# Patient Record
Sex: FEMALE | Race: WHITE | NOT HISPANIC OR LATINO | URBAN - METROPOLITAN AREA
[De-identification: names, ages, dates, MRNs, and addresses within clinical notes are randomized per-mention and may not be internally consistent; named-entity substitution may affect disease eponyms.]

---

## 2024-07-18 ENCOUNTER — OFFICE VISIT (OUTPATIENT)
Dept: URBAN - METROPOLITAN AREA CLINIC 9 | Facility: CLINIC | Age: 59
End: 2024-07-18
Payer: COMMERCIAL

## 2024-07-18 ENCOUNTER — DASHBOARD ENCOUNTERS (OUTPATIENT)
Age: 59
End: 2024-07-18

## 2024-07-18 VITALS
BODY MASS INDEX: 20.09 KG/M2 | HEIGHT: 66 IN | WEIGHT: 125 LBS | SYSTOLIC BLOOD PRESSURE: 110 MMHG | DIASTOLIC BLOOD PRESSURE: 72 MMHG

## 2024-07-18 DIAGNOSIS — R19.4 CHANGE IN BOWEL HABIT: ICD-10-CM

## 2024-07-18 DIAGNOSIS — R93.3 IMAGING OF GASTROINTESTINAL TRACT ABNORMAL: ICD-10-CM

## 2024-07-18 DIAGNOSIS — R10.13 EPIGASTRIC ABDOMINAL PAIN: ICD-10-CM

## 2024-07-18 DIAGNOSIS — K74.69 OTHER CIRRHOSIS OF LIVER: ICD-10-CM

## 2024-07-18 DIAGNOSIS — D86.89 GRANULOMA OF LIVER ASSOCIATED WITH SARCOIDOSIS: ICD-10-CM

## 2024-07-18 PROCEDURE — 99204 OFFICE O/P NEW MOD 45 MIN: CPT | Performed by: INTERNAL MEDICINE

## 2024-07-18 RX ORDER — FAMOTIDINE 40 MG/1
1 TABLET TABLET, FILM COATED ORAL ONCE A DAY
OUTPATIENT

## 2024-07-18 RX ORDER — BUPROPION HYDROCHLORIDE 150 MG/1
1 TABLET IN THE MORNING TABLET, EXTENDED RELEASE ORAL ONCE A DAY
Status: ACTIVE | COMMUNITY

## 2024-07-18 RX ORDER — FAMOTIDINE 40 MG/1
1 TABLET TABLET, FILM COATED ORAL ONCE A DAY
Status: ACTIVE | COMMUNITY

## 2024-07-18 RX ORDER — SUCRALFATE 1 G/1
1 TABLET BEFORE MEALS AND 1 TABLET AT NIGHT TABLET ORAL
OUTPATIENT

## 2024-07-18 RX ORDER — LISDEXAMFETAMINE DIMESYLATE 20 MG/1
1 CAPSULE IN THE MORNING CAPSULE ORAL ONCE A DAY
Refills: 0 | Status: ACTIVE | COMMUNITY

## 2024-07-18 RX ORDER — WHEAT DEXTRIN 3 G/3.5 G
1 TEASPOON POWDER (GRAM) ORAL DAILY
Qty: 30 | Refills: 5 | OUTPATIENT
Start: 2024-07-18 | End: 2025-01-13

## 2024-07-18 RX ORDER — PANTOPRAZOLE SODIUM 40 MG/1
1 TABLET TABLET, DELAYED RELEASE ORAL ONCE A DAY
OUTPATIENT

## 2024-07-18 RX ORDER — TRAZODONE HYDROCHLORIDE 50 MG/1
1 TABLET AT BEDTIME AS NEEDED TABLET ORAL ONCE A DAY
Status: ACTIVE | COMMUNITY

## 2024-07-18 RX ORDER — CLONAZEPAM 0.5 MG/1
1 TABLET TABLET ORAL
Status: ACTIVE | COMMUNITY

## 2024-07-18 RX ORDER — PANTOPRAZOLE SODIUM 40 MG/1
1 TABLET TABLET, DELAYED RELEASE ORAL ONCE A DAY
Status: ACTIVE | COMMUNITY

## 2024-07-18 RX ORDER — URSODIOL 300 MG/1
1 CAPSULE IN AM; 2 CAPSULES IN PM CAPSULE ORAL TWICE A DAY
OUTPATIENT

## 2024-07-18 RX ORDER — ONDANSETRON HYDROCHLORIDE 4 MG/1
1 TABLET TABLET, FILM COATED ORAL AS NEEDED
Status: ACTIVE | COMMUNITY

## 2024-07-18 RX ORDER — FLUOXETINE HYDROCHLORIDE 60 MG/1
1 TABLET TABLET, FILM COATED ORAL; ORAL ONCE A DAY
Status: ACTIVE | COMMUNITY

## 2024-07-18 RX ORDER — SUCRALFATE 1 G/1
1 TABLET BEFORE MEALS AND 1 TABLET AT NIGHT TABLET ORAL
Status: ACTIVE | COMMUNITY

## 2024-07-18 RX ORDER — URSODIOL 300 MG/1
1 CAPSULE IN AM; 2 CAPSULES IN PM CAPSULE ORAL TWICE A DAY
Status: ACTIVE | COMMUNITY

## 2024-07-18 NOTE — HPI-TODAY'S VISIT:
Pt here for evaluation of abnormal imaging . Pt follows [facility] Dr. 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 2021 colon with tics, repeat 5 years 2022 EGD with small gastric varices . Pt here today as in 2024 pt had several months of epigastric abd pain. Pt went to the ED and there CT a/p with gastric thickening and reza-gastric lymphadenopathy. Pt was started on reflux regimen with improvement but not resolution. Pt has not had a recent EGD and due to thickening needs EGD to ensure no cancer. her LAD is likely her sarcoid and she has known liver disease followed at [place] so defer further management to them. For now cont ppi and carafate and decide next steps pending EGD results with plan for HP and duodenal bx. For now hold on colon but given some bowel changes and the pain if the EGD is neg for any clear ulcer or issue will plan on colon. Will also start fiber supplement. .

## 2024-07-23 ENCOUNTER — CLAIMS CREATED FROM THE CLAIM WINDOW (OUTPATIENT)
Dept: URBAN - METROPOLITAN AREA SURGERY CENTER 9 | Facility: SURGERY CENTER | Age: 59
End: 2024-07-23
Payer: COMMERCIAL

## 2024-07-23 ENCOUNTER — CLAIMS CREATED FROM THE CLAIM WINDOW (OUTPATIENT)
Dept: URBAN - METROPOLITAN AREA CLINIC 4 | Facility: CLINIC | Age: 59
End: 2024-07-23
Payer: COMMERCIAL

## 2024-07-23 DIAGNOSIS — K22.10 ULCERATIVE ESOPHAGITIS: ICD-10-CM

## 2024-07-23 DIAGNOSIS — K31.89 OTHER DISEASES OF STOMACH AND DUODENUM: ICD-10-CM

## 2024-07-23 DIAGNOSIS — K29.70 GASTRITIS WITHOUT BLEEDING, UNSPECIFIED CHRONICITY, UNSPECIFIED GASTRITIS TYPE: ICD-10-CM

## 2024-07-23 DIAGNOSIS — K31.7 POLYP OF STOMACH AND DUODENUM: ICD-10-CM

## 2024-07-23 DIAGNOSIS — K31.7 POLYP OF STOMACH FUNDIC GLAND: ICD-10-CM

## 2024-07-23 DIAGNOSIS — K22.10 ULCER OF ESOPHAGUS WITHOUT BLEEDING: ICD-10-CM

## 2024-07-23 DIAGNOSIS — K29.70 GASTRITIS: ICD-10-CM

## 2024-07-23 PROCEDURE — 43239 EGD BIOPSY SINGLE/MULTIPLE: CPT | Performed by: INTERNAL MEDICINE

## 2024-07-23 PROCEDURE — 00731 ANES UPR GI NDSC PX NOS: CPT | Performed by: NURSE ANESTHETIST, CERTIFIED REGISTERED

## 2024-07-23 PROCEDURE — 88305 TISSUE EXAM BY PATHOLOGIST: CPT | Performed by: PATHOLOGY

## 2024-07-23 PROCEDURE — 88341 IMHCHEM/IMCYTCHM EA ADD ANTB: CPT | Performed by: PATHOLOGY

## 2024-07-23 PROCEDURE — 88342 IMHCHEM/IMCYTCHM 1ST ANTB: CPT | Performed by: PATHOLOGY

## 2024-07-23 PROCEDURE — 88312 SPECIAL STAINS GROUP 1: CPT | Performed by: PATHOLOGY

## 2024-07-23 RX ORDER — PANTOPRAZOLE SODIUM 40 MG/1
1 TABLET TABLET, DELAYED RELEASE ORAL ONCE A DAY
Status: ACTIVE | COMMUNITY

## 2024-07-23 RX ORDER — SUCRALFATE 1 G/1
1 TABLET BEFORE MEALS AND 1 TABLET AT NIGHT TABLET ORAL
Status: ACTIVE | COMMUNITY

## 2024-07-23 RX ORDER — TRAZODONE HYDROCHLORIDE 50 MG/1
1 TABLET AT BEDTIME AS NEEDED TABLET ORAL ONCE A DAY
Status: ACTIVE | COMMUNITY

## 2024-07-23 RX ORDER — FAMOTIDINE 40 MG/1
1 TABLET TABLET, FILM COATED ORAL ONCE A DAY
Status: ACTIVE | COMMUNITY

## 2024-07-23 RX ORDER — ONDANSETRON HYDROCHLORIDE 4 MG/1
1 TABLET TABLET, FILM COATED ORAL AS NEEDED
Status: ACTIVE | COMMUNITY

## 2024-07-23 RX ORDER — URSODIOL 300 MG/1
1 CAPSULE IN AM; 2 CAPSULES IN PM CAPSULE ORAL TWICE A DAY
Status: ACTIVE | COMMUNITY

## 2024-07-23 RX ORDER — BUPROPION HYDROCHLORIDE 150 MG/1
1 TABLET IN THE MORNING TABLET, EXTENDED RELEASE ORAL ONCE A DAY
Status: ACTIVE | COMMUNITY

## 2024-07-23 RX ORDER — FLUOXETINE HYDROCHLORIDE 60 MG/1
1 TABLET TABLET, FILM COATED ORAL; ORAL ONCE A DAY
Status: ACTIVE | COMMUNITY

## 2024-07-23 RX ORDER — PANTOPRAZOLE SODIUM 40 MG/1
1 TABLET TABLET, DELAYED RELEASE ORAL TWICE A DAY
Qty: 60 TABLET | Refills: 4

## 2024-07-23 RX ORDER — CLONAZEPAM 0.5 MG/1
1 TABLET TABLET ORAL
Status: ACTIVE | COMMUNITY

## 2024-07-23 RX ORDER — WHEAT DEXTRIN 3 G/3.5 G
1 TEASPOON POWDER (GRAM) ORAL DAILY
Qty: 30 | Refills: 5 | Status: ACTIVE | COMMUNITY
Start: 2024-07-18 | End: 2025-01-13

## 2024-07-23 RX ORDER — LISDEXAMFETAMINE DIMESYLATE 20 MG/1
1 CAPSULE IN THE MORNING CAPSULE ORAL ONCE A DAY
Refills: 0 | Status: ACTIVE | COMMUNITY

## 2024-07-31 ENCOUNTER — TELEPHONE ENCOUNTER (OUTPATIENT)
Dept: URBAN - METROPOLITAN AREA CLINIC 7 | Facility: CLINIC | Age: 59
End: 2024-07-31

## 2024-07-31 RX ORDER — SUCRALFATE 1 G/1
1 TABLET BEFORE MEALS AND 1 TABLET AT NIGHT TABLET ORAL
COMMUNITY

## 2024-07-31 RX ORDER — LISDEXAMFETAMINE DIMESYLATE 20 MG/1
1 CAPSULE IN THE MORNING CAPSULE ORAL ONCE A DAY
Refills: 0 | COMMUNITY

## 2024-07-31 RX ORDER — CLONAZEPAM 0.5 MG/1
1 TABLET TABLET ORAL
COMMUNITY

## 2024-07-31 RX ORDER — FLUOXETINE HYDROCHLORIDE 60 MG/1
1 TABLET TABLET, FILM COATED ORAL; ORAL ONCE A DAY
COMMUNITY

## 2024-07-31 RX ORDER — PANTOPRAZOLE SODIUM 40 MG/1
1 TABLET TABLET, DELAYED RELEASE ORAL TWICE A DAY
Qty: 60 TABLET | Refills: 4 | COMMUNITY

## 2024-07-31 RX ORDER — WHEAT DEXTRIN 3 G/3.5 G
1 TEASPOON POWDER (GRAM) ORAL DAILY
Qty: 30 | Refills: 5 | COMMUNITY
Start: 2024-07-18 | End: 2025-01-13

## 2024-07-31 RX ORDER — ONDANSETRON HYDROCHLORIDE 4 MG/1
1 TABLET TABLET, FILM COATED ORAL AS NEEDED
COMMUNITY

## 2024-07-31 RX ORDER — ATORVASTATIN CALCIUM 20 MG/1
1 TABLET TABLET, FILM COATED ORAL ONCE A DAY
Status: ACTIVE | COMMUNITY

## 2024-07-31 RX ORDER — URSODIOL 300 MG/1
1 CAPSULE IN AM; 2 CAPSULES IN PM CAPSULE ORAL TWICE A DAY
COMMUNITY

## 2024-07-31 RX ORDER — FAMOTIDINE 40 MG/1
1 TABLET TABLET, FILM COATED ORAL ONCE A DAY
COMMUNITY

## 2024-07-31 RX ORDER — TRAZODONE HYDROCHLORIDE 50 MG/1
1 TABLET AT BEDTIME AS NEEDED TABLET ORAL ONCE A DAY
COMMUNITY

## 2024-07-31 RX ORDER — BUPROPION HYDROCHLORIDE 150 MG/1
1 TABLET IN THE MORNING TABLET, EXTENDED RELEASE ORAL ONCE A DAY
COMMUNITY

## 2024-08-08 ENCOUNTER — TELEPHONE ENCOUNTER (OUTPATIENT)
Dept: URBAN - METROPOLITAN AREA SURGERY CENTER 9 | Facility: SURGERY CENTER | Age: 59
End: 2024-08-08

## 2024-08-14 ENCOUNTER — OFFICE VISIT (OUTPATIENT)
Dept: URBAN - METROPOLITAN AREA CLINIC 9 | Facility: CLINIC | Age: 59
End: 2024-08-14

## 2024-08-14 ENCOUNTER — OFFICE VISIT (OUTPATIENT)
Dept: URBAN - METROPOLITAN AREA SURGERY CENTER 9 | Facility: SURGERY CENTER | Age: 59
End: 2024-08-14

## 2024-09-24 ENCOUNTER — OFFICE VISIT (OUTPATIENT)
Dept: URBAN - METROPOLITAN AREA CLINIC 9 | Facility: CLINIC | Age: 59
End: 2024-09-24

## 2024-11-05 ENCOUNTER — OFFICE VISIT (OUTPATIENT)
Dept: URBAN - METROPOLITAN AREA CLINIC 9 | Facility: CLINIC | Age: 59
End: 2024-11-05
Payer: COMMERCIAL

## 2024-11-05 VITALS
DIASTOLIC BLOOD PRESSURE: 74 MMHG | WEIGHT: 129 LBS | SYSTOLIC BLOOD PRESSURE: 118 MMHG | BODY MASS INDEX: 20.73 KG/M2 | HEIGHT: 66 IN

## 2024-11-05 DIAGNOSIS — R10.13 DYSPEPSIA: ICD-10-CM

## 2024-11-05 DIAGNOSIS — K31.89 GASTRIC NODULE: ICD-10-CM

## 2024-11-05 PROCEDURE — 99214 OFFICE O/P EST MOD 30 MIN: CPT | Performed by: INTERNAL MEDICINE

## 2024-11-05 RX ORDER — SUCRALFATE 1 G/1
1 TABLET BEFORE MEALS AND 1 TABLET AT NIGHT TABLET ORAL
Status: ON HOLD | COMMUNITY

## 2024-11-05 RX ORDER — TRAZODONE HYDROCHLORIDE 50 MG/1
1 TABLET AT BEDTIME AS NEEDED TABLET ORAL ONCE A DAY
Status: ACTIVE | COMMUNITY

## 2024-11-05 RX ORDER — ONDANSETRON HYDROCHLORIDE 4 MG/1
1 TABLET TABLET, FILM COATED ORAL AS NEEDED
Status: ACTIVE | COMMUNITY

## 2024-11-05 RX ORDER — PANTOPRAZOLE SODIUM 40 MG/1
1 TABLET TABLET, DELAYED RELEASE ORAL TWICE A DAY
Qty: 60 TABLET | Refills: 4 | Status: ACTIVE | COMMUNITY

## 2024-11-05 RX ORDER — SUCRALFATE 1 G/1
1 TABLET BEFORE MEALS AND 1 TABLET AT NIGHT TABLET ORAL
OUTPATIENT

## 2024-11-05 RX ORDER — INFLIXIMAB 100 MG/10ML
AS DIRECTED INJECTION, POWDER, LYOPHILIZED, FOR SOLUTION INTRAVENOUS
Status: ACTIVE | COMMUNITY

## 2024-11-05 RX ORDER — CLONAZEPAM 0.5 MG/1
1 TABLET TABLET ORAL
Status: ACTIVE | COMMUNITY

## 2024-11-05 RX ORDER — WHEAT DEXTRIN 3 G/3.5 G
1 TEASPOON POWDER (GRAM) ORAL DAILY
Qty: 30 | Refills: 5 | Status: ACTIVE | COMMUNITY
Start: 2024-07-18 | End: 2025-01-13

## 2024-11-05 RX ORDER — URSODIOL 300 MG/1
1 CAPSULE IN AM; 2 CAPSULES IN PM CAPSULE ORAL TWICE A DAY
Status: ACTIVE | COMMUNITY

## 2024-11-05 RX ORDER — ATORVASTATIN CALCIUM 20 MG/1
1 TABLET TABLET, FILM COATED ORAL ONCE A DAY
Status: ACTIVE | COMMUNITY

## 2024-11-05 RX ORDER — PANTOPRAZOLE SODIUM 40 MG/1
1 TABLET TABLET, DELAYED RELEASE ORAL TWICE A DAY
OUTPATIENT

## 2024-11-05 RX ORDER — FAMOTIDINE 40 MG/1
1 TABLET TABLET, FILM COATED ORAL ONCE A DAY
Status: ON HOLD | COMMUNITY

## 2024-11-05 RX ORDER — BUPROPION HYDROCHLORIDE 150 MG/1
1 TABLET IN THE MORNING TABLET, EXTENDED RELEASE ORAL ONCE A DAY
Status: ACTIVE | COMMUNITY

## 2024-11-05 RX ORDER — LISDEXAMFETAMINE DIMESYLATE 20 MG/1
1 CAPSULE IN THE MORNING CAPSULE ORAL ONCE A DAY
Refills: 0 | Status: ACTIVE | COMMUNITY

## 2024-11-05 RX ORDER — FLUOXETINE HYDROCHLORIDE 60 MG/1
1 TABLET TABLET, FILM COATED ORAL; ORAL ONCE A DAY
Status: ACTIVE | COMMUNITY

## 2024-11-05 NOTE — HPI-TODAY'S VISIT:
Pt here for f/u of epigastric sx . Pt follows [facility] Dr. 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 2021 colon with tics, repeat 5 years 2022 EGD with small gastric varices 7/2024 with EGD with antral nodularity, bx neg, EUS advised .. Pt here for f/u. We had planned an EUS but that was cancelled due to her need for an ICD at [facility] due to her cardiac sarcoid so the EUS was cancelled and pt is seeing an EP cardiologist. She states she has an intact EF and the role of the ICD is preventative for cardiac sarcoid. She also is still on remicade. I will request clearance and likely she is ok for EUS prior to cardiac procedurs.  . . .

## 2024-11-08 ENCOUNTER — WEB ENCOUNTER (OUTPATIENT)
Dept: URBAN - METROPOLITAN AREA CLINIC 9 | Facility: CLINIC | Age: 59
End: 2024-11-08

## 2024-11-08 RX ORDER — SUCRALFATE 1 G/1
1 TABLET BEFORE MEALS AND 1 TABLET AT NIGHT TABLET ORAL TWICE A DAY
Qty: 180 TABLET | Refills: 3

## 2024-11-11 ENCOUNTER — WEB ENCOUNTER (OUTPATIENT)
Dept: URBAN - METROPOLITAN AREA CLINIC 9 | Facility: CLINIC | Age: 59
End: 2024-11-11

## 2024-11-18 ENCOUNTER — OFFICE VISIT (OUTPATIENT)
Dept: URBAN - METROPOLITAN AREA SURGERY CENTER 9 | Facility: SURGERY CENTER | Age: 59
End: 2024-11-18
Payer: COMMERCIAL

## 2024-11-18 DIAGNOSIS — K31.89 OTHER DISEASES OF STOMACH AND DUODENUM: ICD-10-CM

## 2024-11-18 DIAGNOSIS — R93.3 ABNORMAL FINDINGS ON DIAGNOSTIC IMAGING OF OTHER PARTS OF DIGESTIVE TRACT: ICD-10-CM

## 2024-11-18 DIAGNOSIS — R93.3 ABNORMAL FINDING ON DIAGNOSTIC IMAGING OF GI TRACT: ICD-10-CM

## 2024-11-18 PROCEDURE — 43239 EGD BIOPSY SINGLE/MULTIPLE: CPT | Performed by: INTERNAL MEDICINE

## 2024-11-18 PROCEDURE — 43259 EGD US EXAM DUODENUM/JEJUNUM: CPT | Performed by: INTERNAL MEDICINE

## 2024-11-18 PROCEDURE — 00731 ANES UPR GI NDSC PX NOS: CPT | Performed by: NURSE ANESTHETIST, CERTIFIED REGISTERED

## 2024-11-18 RX ORDER — ONDANSETRON HYDROCHLORIDE 4 MG/1
1 TABLET TABLET, FILM COATED ORAL AS NEEDED
Status: ACTIVE | COMMUNITY

## 2024-11-18 RX ORDER — URSODIOL 300 MG/1
1 CAPSULE IN AM; 2 CAPSULES IN PM CAPSULE ORAL TWICE A DAY
Status: ACTIVE | COMMUNITY

## 2024-11-18 RX ORDER — SUCRALFATE 1 G/1
1 TABLET BEFORE MEALS AND 1 TABLET AT NIGHT TABLET ORAL TWICE A DAY
Qty: 180 TABLET | Refills: 3 | Status: ACTIVE | COMMUNITY

## 2024-11-18 RX ORDER — TRAZODONE HYDROCHLORIDE 50 MG/1
1 TABLET AT BEDTIME AS NEEDED TABLET ORAL ONCE A DAY
Status: ACTIVE | COMMUNITY

## 2024-11-18 RX ORDER — BUPROPION HYDROCHLORIDE 150 MG/1
1 TABLET IN THE MORNING TABLET, EXTENDED RELEASE ORAL ONCE A DAY
Status: ACTIVE | COMMUNITY

## 2024-11-18 RX ORDER — ATORVASTATIN CALCIUM 20 MG/1
1 TABLET TABLET, FILM COATED ORAL ONCE A DAY
Status: ACTIVE | COMMUNITY

## 2024-11-18 RX ORDER — INFLIXIMAB 100 MG/10ML
AS DIRECTED INJECTION, POWDER, LYOPHILIZED, FOR SOLUTION INTRAVENOUS
Status: ACTIVE | COMMUNITY

## 2024-11-18 RX ORDER — FAMOTIDINE 40 MG/1
1 TABLET TABLET, FILM COATED ORAL ONCE A DAY
Status: ON HOLD | COMMUNITY

## 2024-11-18 RX ORDER — WHEAT DEXTRIN 3 G/3.5 G
1 TEASPOON POWDER (GRAM) ORAL DAILY
Qty: 30 | Refills: 5 | Status: ACTIVE | COMMUNITY
Start: 2024-07-18 | End: 2025-01-13

## 2024-11-18 RX ORDER — LISDEXAMFETAMINE DIMESYLATE 20 MG/1
1 CAPSULE IN THE MORNING CAPSULE ORAL ONCE A DAY
Refills: 0 | Status: ACTIVE | COMMUNITY

## 2024-11-18 RX ORDER — FLUOXETINE HYDROCHLORIDE 60 MG/1
1 TABLET TABLET, FILM COATED ORAL; ORAL ONCE A DAY
Status: ACTIVE | COMMUNITY

## 2024-11-18 RX ORDER — PANTOPRAZOLE SODIUM 40 MG/1
1 TABLET TABLET, DELAYED RELEASE ORAL TWICE A DAY
Status: ACTIVE | COMMUNITY

## 2024-11-18 RX ORDER — CLONAZEPAM 0.5 MG/1
1 TABLET TABLET ORAL
Status: ACTIVE | COMMUNITY

## 2024-12-03 ENCOUNTER — TELEPHONE ENCOUNTER (OUTPATIENT)
Dept: URBAN - METROPOLITAN AREA CLINIC 9 | Facility: CLINIC | Age: 59
End: 2024-12-03

## 2024-12-03 ENCOUNTER — LAB OUTSIDE AN ENCOUNTER (OUTPATIENT)
Dept: URBAN - METROPOLITAN AREA CLINIC 9 | Facility: CLINIC | Age: 59
End: 2024-12-03

## 2024-12-18 ENCOUNTER — TELEPHONE ENCOUNTER (OUTPATIENT)
Dept: URBAN - METROPOLITAN AREA CLINIC 9 | Facility: CLINIC | Age: 59
End: 2024-12-18

## 2025-01-21 ENCOUNTER — OFFICE VISIT (OUTPATIENT)
Dept: URBAN - METROPOLITAN AREA SURGERY CENTER 9 | Facility: SURGERY CENTER | Age: 60
End: 2025-01-21

## 2025-05-06 ENCOUNTER — OFFICE VISIT (OUTPATIENT)
Dept: URBAN - METROPOLITAN AREA CLINIC 9 | Facility: CLINIC | Age: 60
End: 2025-05-06

## 2025-05-06 ENCOUNTER — TELEPHONE ENCOUNTER (OUTPATIENT)
Dept: URBAN - METROPOLITAN AREA CLINIC 7 | Facility: CLINIC | Age: 60
End: 2025-05-06

## 2025-05-06 RX ORDER — PANTOPRAZOLE SODIUM 40 MG/1
1 TABLET TABLET, DELAYED RELEASE ORAL TWICE A DAY
Qty: 60 TABLET | Refills: 3

## 2025-07-17 ENCOUNTER — TELEPHONE ENCOUNTER (OUTPATIENT)
Dept: URBAN - METROPOLITAN AREA CLINIC 9 | Facility: CLINIC | Age: 60
End: 2025-07-17

## 2025-07-25 ENCOUNTER — OFFICE VISIT (OUTPATIENT)
Dept: URBAN - METROPOLITAN AREA CLINIC 9 | Facility: CLINIC | Age: 60
End: 2025-07-25
Payer: COMMERCIAL

## 2025-07-25 DIAGNOSIS — K74.69 OTHER CIRRHOSIS OF LIVER: ICD-10-CM

## 2025-07-25 DIAGNOSIS — R10.13 DYSPEPSIA: ICD-10-CM

## 2025-07-25 DIAGNOSIS — R10.30 LOWER ABDOMINAL PAIN: ICD-10-CM

## 2025-07-25 DIAGNOSIS — D86.89 GRANULOMA OF LIVER ASSOCIATED WITH SARCOIDOSIS: ICD-10-CM

## 2025-07-25 DIAGNOSIS — R19.4 CHANGE IN BOWEL HABIT: ICD-10-CM

## 2025-07-25 PROCEDURE — 99214 OFFICE O/P EST MOD 30 MIN: CPT | Performed by: INTERNAL MEDICINE

## 2025-07-25 RX ORDER — URSODIOL 300 MG/1
1 CAPSULE IN AM; 2 CAPSULES IN PM CAPSULE ORAL TWICE A DAY
OUTPATIENT

## 2025-07-25 RX ORDER — FAMOTIDINE 40 MG/1
1 TABLET TABLET, FILM COATED ORAL ONCE A DAY
Status: ON HOLD | COMMUNITY

## 2025-07-25 RX ORDER — SUCRALFATE 1 G/1
1 TABLET BEFORE MEALS AND 1 TABLET AT NIGHT TABLET ORAL TWICE A DAY
Qty: 180 TABLET | Refills: 3 | Status: ACTIVE | COMMUNITY

## 2025-07-25 RX ORDER — ONDANSETRON HYDROCHLORIDE 4 MG/1
1 TABLET TABLET, FILM COATED ORAL AS NEEDED
Status: ACTIVE | COMMUNITY

## 2025-07-25 RX ORDER — URSODIOL 300 MG/1
1 CAPSULE IN AM; 2 CAPSULES IN PM CAPSULE ORAL TWICE A DAY
Status: ACTIVE | COMMUNITY

## 2025-07-25 RX ORDER — CLONAZEPAM 0.5 MG/1
1 TABLET TABLET ORAL
Status: ACTIVE | COMMUNITY

## 2025-07-25 RX ORDER — FLUOXETINE HYDROCHLORIDE 60 MG/1
1 TABLET TABLET, FILM COATED ORAL; ORAL ONCE A DAY
Status: ACTIVE | COMMUNITY

## 2025-07-25 RX ORDER — LISDEXAMFETAMINE DIMESYLATE 20 MG/1
1 CAPSULE IN THE MORNING CAPSULE ORAL ONCE A DAY
Refills: 0 | Status: ACTIVE | COMMUNITY

## 2025-07-25 RX ORDER — ATORVASTATIN CALCIUM 40 MG/1
1 TABLET TABLET, FILM COATED ORAL ONCE A DAY
Status: ACTIVE | COMMUNITY

## 2025-07-25 RX ORDER — PANTOPRAZOLE SODIUM 40 MG/1
1 TABLET TABLET, DELAYED RELEASE ORAL TWICE A DAY
Qty: 60 TABLET | Refills: 3 | Status: ACTIVE | COMMUNITY

## 2025-07-25 RX ORDER — WHEAT DEXTRIN 3 G/3.5 G
AS DIRECTED POWDER (GRAM) ORAL
OUTPATIENT
Start: 2025-07-25

## 2025-07-25 RX ORDER — TRAZODONE HYDROCHLORIDE 50 MG/1
1 TABLET AT BEDTIME AS NEEDED TABLET ORAL ONCE A DAY
Status: ACTIVE | COMMUNITY

## 2025-07-25 RX ORDER — BUPROPION HYDROCHLORIDE 150 MG/1
1 TABLET IN THE MORNING TABLET, EXTENDED RELEASE ORAL ONCE A DAY
Status: ACTIVE | COMMUNITY

## 2025-07-25 RX ORDER — INFLIXIMAB 100 MG/10ML
AS DIRECTED INJECTION, POWDER, LYOPHILIZED, FOR SOLUTION INTRAVENOUS
Status: ACTIVE | COMMUNITY

## 2025-07-25 RX ORDER — PANTOPRAZOLE SODIUM 40 MG/1
1 TABLET TABLET, DELAYED RELEASE ORAL TWICE A DAY
OUTPATIENT

## 2025-07-25 NOTE — HPI-TODAY'S VISIT:
Pt here for f/u of epigastric sx . Pt follows [facility] Dr. 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 2021 colon with tics, repeat 5 years 2022 EGD with small gastric varices 7/2024 with EGD with antral nodularity, bx neg, EUS advised 11/2024 EUS with gastric nodularity . Pt here for f/u. She follows at [facility] for her hepatic sarcoid and cardiac sarcoid. She is on pantoprazole but now having recurrent cough. She did have the gastric nodularity and repeat eval was advised. Last EGD was 11/2024. I advised we plan on EGD with cards clearance. Will cont PPI. She has a CT a/p to be done from [facility] order. I also advised she restart benefiber.  . . .

## 2025-07-25 NOTE — PHYSICAL EXAM HENT:
Head, normocephalic, atraumatic, External nose normal appearance,
Rivaroxaban/Xarelto increases your risk for bleeding. Notify your doctor if you experience any of the following side effects: unusual bleeding or bruising, vomiting blood or coffee ground-like material, red or black stool, itching or hives, chest tightness, trouble breathing, swelling in your face or hands, swelling in your mouth or throat, change in how much or how often you urinate, red or brown urine, heavy menstrual or vaginal bleeding, or blistering or peeling skin. When Rivaroxaban/Xarelto is taken with other medicines, they can affect how it works. Taking other medications such as aspirin, antibiotics, antifungals, blood thinners, nonsteroidal anti-inflammatories, and medications that treat depression can increase your risk of bleeding. It is very important to tell your health care provider about all of the other medicines, including over-the-counter medications, herbs, and vitamins you are taking.  DO NOT start, stop, or change the dosage of any medicine, including over-the-counter medicines, vitamins, and herbal products without your doctor’s approval.  Any products containing aspirin or are nonsteroidal anti-inflammatories lessen the blood’s ability to form clots and adds to the effect of Rivaroxaban/Xarelto. Never take aspirin or medicines that contain aspirin without speaking to your doctor.

## 2025-08-07 ENCOUNTER — LAB OUTSIDE AN ENCOUNTER (OUTPATIENT)
Dept: URBAN - METROPOLITAN AREA CLINIC 9 | Facility: CLINIC | Age: 60
End: 2025-08-07

## 2025-08-12 ENCOUNTER — TELEPHONE ENCOUNTER (OUTPATIENT)
Dept: URBAN - METROPOLITAN AREA CLINIC 9 | Facility: CLINIC | Age: 60
End: 2025-08-12

## 2025-08-13 ENCOUNTER — OFFICE VISIT (OUTPATIENT)
Dept: URBAN - METROPOLITAN AREA CLINIC 9 | Facility: CLINIC | Age: 60
End: 2025-08-13

## 2025-08-14 ENCOUNTER — OFFICE VISIT (OUTPATIENT)
Dept: URBAN - METROPOLITAN AREA SURGERY CENTER 9 | Facility: SURGERY CENTER | Age: 60
End: 2025-08-14

## 2025-08-21 ENCOUNTER — OFFICE VISIT (OUTPATIENT)
Dept: URBAN - METROPOLITAN AREA SURGERY CENTER 9 | Facility: SURGERY CENTER | Age: 60
End: 2025-08-21